# Patient Record
Sex: FEMALE | Race: BLACK OR AFRICAN AMERICAN | Employment: OTHER | URBAN - METROPOLITAN AREA
[De-identification: names, ages, dates, MRNs, and addresses within clinical notes are randomized per-mention and may not be internally consistent; named-entity substitution may affect disease eponyms.]

---
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
## 2023-02-06 NOTE — PROGRESS NOTES
Subjective:     History of Present Illness  Flako Ballard is a 11 y.o. female who presents for physical     Review of Systems  A comprehensive review of systems was negative. Patient Active Problem List   Diagnosis Code    Dysmenorrhea N94.6    Ankle sprain S93.409A    Abnormal laboratory test result R89.9    Skin rash R21    Bunion M21.619    Sports physical Z02.5     Patient Active Problem List    Diagnosis Date Noted    Sports physical 01/25/2018    Skin rash 05/22/2015    Bunion 05/22/2015    Abnormal laboratory test result 12/16/2014    Dysmenorrhea 12/02/2014    Ankle sprain 12/02/2014     Current Outpatient Prescriptions   Medication Sig Dispense Refill    metroNIDAZOLE (FLAGYL) 500 mg tablet Take 1 Tab by mouth three (3) times daily for 10 days. 30 Tab 0    drospirenone-ethinyl estradiol (MARIA) 3-0.03 mg tab Take 1 Tab by mouth daily. 1 Dose Pack 3    loratadine (CLARITIN) 10 mg tablet Take 1 Tab by mouth daily. 90 Tab 1    fluticasone (FLONASE) 50 mcg/actuation nasal spray One spray each nostril in am and one in school as needed 2 Bottle 3    hydrocortisone (HYTONE) 2.5 % topical cream Apply  to affected area two (2) times a day. use thin layer 30 g 2    acetaminophen (TYLENOL) 325 mg tablet Take  by mouth every four (4) hours as needed for Pain.  clotrimazole (CLOTRIM ANTIFUNGAL) 1 % topical cream Apply  to affected area two (2) times a day. 15 g 2     Allergies   Allergen Reactions    Pollen Extracts Swelling and Sneezing     Past Medical History:   Diagnosis Date    Heavy menstrual bleeding     Menstrual cramp      No past surgical history on file.   Family History   Problem Relation Age of Onset    Diabetes Maternal Grandmother     Hypertension Maternal Grandmother     Cancer Maternal Grandmother      breast    Lung Disease Maternal Grandfather     Asthma Maternal Grandfather      Social History   Substance Use Topics    Smoking status: Never Smoker    Smokeless tobacco: Never Used    Alcohol use Not on file            Objective:     Visit Vitals    /62 (BP 1 Location: Left arm, BP Patient Position: Sitting)    Pulse 83    Temp 98.7 °F (37.1 °C) (Oral)    Resp 18    Ht 5' 5\" (1.651 m)    Wt 192 lb (87.1 kg)    LMP 08/23/2017    SpO2 100%    BMI 31.95 kg/m2     Visit Vitals    /62 (BP 1 Location: Left arm, BP Patient Position: Sitting)    Pulse 83    Temp 98.7 °F (37.1 °C) (Oral)    Resp 18    Ht 5' 5\" (1.651 m)    Wt 192 lb (87.1 kg)    LMP 08/23/2017    SpO2 100%    BMI 31.95 kg/m2       General appearance  alert, cooperative, no distress, appears stated age   Head  Normocephalic, without obvious abnormality, atraumatic   Eyes  conjunctivae/corneas clear. PERRL, EOM's intact. Fundi benign   Ears  normal TM's and external ear canals AU   Nose Nares normal. Septum midline. Mucosa normal. No drainage or sinus tenderness. Throat Lips, mucosa, and tongue normal. Teeth and gums normal   Neck supple, symmetrical, trachea midline, no adenopathy, thyroid: not enlarged, symmetric, no tenderness/mass/nodules, no carotid bruit and no JVD   Back   symmetric, no curvature. ROM normal. No CVA tenderness   Lungs   clear to auscultation bilaterally   Breasts  no masses, tenderness   Heart  regular rate and rhythm, S1, S2 normal, no murmur, click, rub or gallop   Abdomen   soft, non-tender. Bowel sounds normal. No masses,  No organomegaly   Pelvic Deferred   Extremities extremities normal, atraumatic, no cyanosis or edema   Pulses 2+ and symmetric   Skin Skin color, texture, turgor normal. No rashes or lesions   Lymph nodes Cervical, supraclavicular, and axillary nodes normal.   Neurologic Normal       Assessment:     Healthy 11 y.o. old female with no physical activity limitations.     Plan:   1)Anticipatory Guidance: Gave a handout on well teen issues at this age , importance of varied diet, minimize junk food, importance of regular dental care, seat belts/ sports protective gear/ helmet safety/ swimming safety, sunscreen, safe storage of any firearms in the home, healthy sexual awareness/ relationships, reviewed tobacco, alcohol and drug dangers, answered  Amy's questions about: None  2) No orders of the defined types were placed in this encounter. verbal cues/nonverbal cues (demo/gestures)/1 person assist

## 2023-03-20 ENCOUNTER — TELEPHONE (OUTPATIENT)
Dept: FAMILY MEDICINE CLINIC | Facility: CLINIC | Age: 18
End: 2023-03-20

## 2023-03-20 NOTE — TELEPHONE ENCOUNTER
Pt is trying to be seen for a possible exposure to an STD. She believes she might have and and said that she is pretty scard by it. She wants to be seen on either Thursday or Friday because she is off work but neither Dr Niya Sterling or Dr Collin Henriquez had an openings. Please advise.

## 2023-03-28 ENCOUNTER — TELEPHONE (OUTPATIENT)
Dept: FAMILY MEDICINE CLINIC | Facility: CLINIC | Age: 18
End: 2023-03-28

## 2023-03-29 NOTE — TELEPHONE ENCOUNTER
Appt scheduled.      Future Appointments   Date Time Provider Lizz Wendy   4/13/2023 10:30 AM Dominique Yao MD [facility]

## 2023-10-20 ENCOUNTER — OFFICE VISIT (OUTPATIENT)
Facility: CLINIC | Age: 18
End: 2023-10-20
Payer: OTHER GOVERNMENT

## 2023-10-20 VITALS
OXYGEN SATURATION: 99 % | RESPIRATION RATE: 20 BRPM | BODY MASS INDEX: 32.65 KG/M2 | HEIGHT: 65 IN | WEIGHT: 196 LBS | DIASTOLIC BLOOD PRESSURE: 70 MMHG | SYSTOLIC BLOOD PRESSURE: 100 MMHG | TEMPERATURE: 97.7 F | HEART RATE: 111 BPM

## 2023-10-20 DIAGNOSIS — R45.1 AGITATION: ICD-10-CM

## 2023-10-20 DIAGNOSIS — R46.89 BEHAVIORAL CHANGE: ICD-10-CM

## 2023-10-20 DIAGNOSIS — Z34.03 FIRST PREGNANCY, THIRD TRIMESTER: ICD-10-CM

## 2023-10-20 DIAGNOSIS — Z76.89 ENCOUNTER TO ESTABLISH CARE: Primary | ICD-10-CM

## 2023-10-20 PROCEDURE — 99204 OFFICE O/P NEW MOD 45 MIN: CPT | Performed by: STUDENT IN AN ORGANIZED HEALTH CARE EDUCATION/TRAINING PROGRAM

## 2023-10-20 RX ORDER — FERROUS SULFATE 325(65) MG
1 TABLET ORAL DAILY
COMMUNITY
Start: 2023-07-24

## 2023-10-20 SDOH — ECONOMIC STABILITY: HOUSING INSECURITY
IN THE LAST 12 MONTHS, WAS THERE A TIME WHEN YOU DID NOT HAVE A STEADY PLACE TO SLEEP OR SLEPT IN A SHELTER (INCLUDING NOW)?: NO

## 2023-10-20 SDOH — ECONOMIC STABILITY: FOOD INSECURITY: WITHIN THE PAST 12 MONTHS, YOU WORRIED THAT YOUR FOOD WOULD RUN OUT BEFORE YOU GOT MONEY TO BUY MORE.: NEVER TRUE

## 2023-10-20 SDOH — ECONOMIC STABILITY: INCOME INSECURITY: HOW HARD IS IT FOR YOU TO PAY FOR THE VERY BASICS LIKE FOOD, HOUSING, MEDICAL CARE, AND HEATING?: NOT HARD AT ALL

## 2023-10-20 SDOH — ECONOMIC STABILITY: FOOD INSECURITY: WITHIN THE PAST 12 MONTHS, THE FOOD YOU BOUGHT JUST DIDN'T LAST AND YOU DIDN'T HAVE MONEY TO GET MORE.: NEVER TRUE

## 2023-10-20 ASSESSMENT — ANXIETY QUESTIONNAIRES
GAD7 TOTAL SCORE: 0
6. BECOMING EASILY ANNOYED OR IRRITABLE: 0
1. FEELING NERVOUS, ANXIOUS, OR ON EDGE: 0
7. FEELING AFRAID AS IF SOMETHING AWFUL MIGHT HAPPEN: 0
4. TROUBLE RELAXING: 0
IF YOU CHECKED OFF ANY PROBLEMS ON THIS QUESTIONNAIRE, HOW DIFFICULT HAVE THESE PROBLEMS MADE IT FOR YOU TO DO YOUR WORK, TAKE CARE OF THINGS AT HOME, OR GET ALONG WITH OTHER PEOPLE: NOT DIFFICULT AT ALL
3. WORRYING TOO MUCH ABOUT DIFFERENT THINGS: 0
2. NOT BEING ABLE TO STOP OR CONTROL WORRYING: 0
5. BEING SO RESTLESS THAT IT IS HARD TO SIT STILL: 0

## 2023-10-20 ASSESSMENT — PATIENT HEALTH QUESTIONNAIRE - PHQ9
2. FEELING DOWN, DEPRESSED OR HOPELESS: 0
SUM OF ALL RESPONSES TO PHQ9 QUESTIONS 1 & 2: 0
SUM OF ALL RESPONSES TO PHQ QUESTIONS 1-9: 0
1. LITTLE INTEREST OR PLEASURE IN DOING THINGS: 0
SUM OF ALL RESPONSES TO PHQ QUESTIONS 1-9: 0

## 2023-10-20 NOTE — PROGRESS NOTES
History of Present Illness  Dejan Morris is a 19 y.o. female who presents today for management of    Chief Complaint   Patient presents with    New Patient     CC: new patient here for healthcare maintenance    -Patient is here to establish care. -Previous PCP: Dr Cammy Cote, 3 months ago  -Works at [employer]  -She lives at home with grandparents   -Pt reports good support system with family/friends and feels safe at home.  -6 months pregnant, sees Ob/gyn at [facility]      Healthcare maintenance:  Immunizations:  Flu vacc: overdue  TDaP vacc: overdue  COVID vacc: overdue  Last Pap: never done   Sexual health: Pt declines STI screening  Mood: Reports stable mood overall. Some depression/anxiety that comes and goes. Also has problems with anger and increased irritability/agitation      10/20/2023     9:45 AM   PHQ-9    Little interest or pleasure in doing things 0   Feeling down, depressed, or hopeless 0   PHQ-2 Score 0   PHQ-9 Total Score 0       Past Medical History  Past Medical History:   Diagnosis Date    Heavy menstrual bleeding     Menstrual cramp       Surgical History  Past Surgical History:   Procedure Laterality Date    ORTHOPEDIC SURGERY      hand      Current Medications  Current Outpatient Medications   Medication Sig    FEROSUL 325 (65 Fe) MG tablet Take 1 tablet by mouth daily as directed    sertraline (ZOLOFT) 50 MG tablet Take 1 tablet by mouth daily    Prenatal Vit-DSS-Fe Cbn-FA (PRENATAL AD PO) Take by mouth     No current facility-administered medications for this visit.      Allergies/Drug Reactions  Allergies   Allergen Reactions    Pollen Extract Swelling     Other reaction(s): Sneezing      Family History  Family History   Problem Relation Age of Onset    Diabetes Maternal Grandmother     Asthma Maternal Grandfather     Cancer Maternal Grandmother         breast    Lung Disease Maternal Grandfather     Hypertension Maternal Grandmother       Social History  Social History

## 2023-10-23 ASSESSMENT — ENCOUNTER SYMPTOMS
SHORTNESS OF BREATH: 0
ABDOMINAL PAIN: 0

## 2024-01-24 PROBLEM — Z91.199 NO-SHOW FOR APPOINTMENT: Status: ACTIVE | Noted: 2024-01-24

## 2024-01-24 PROBLEM — Z67.41 TYPE O BLOOD, RH NEGATIVE: Status: ACTIVE | Noted: 2024-01-24

## 2024-01-24 PROBLEM — Z28.21 INFLUENZA VACCINATION DECLINED BY PATIENT: Status: ACTIVE | Noted: 2024-01-24

## 2024-01-24 PROBLEM — Z34.90 ENCOUNTER FOR ELECTIVE INDUCTION OF LABOR: Status: ACTIVE | Noted: 2024-01-24

## 2024-01-24 PROBLEM — J30.89 NON-SEASONAL ALLERGIC RHINITIS: Status: RESOLVED | Noted: 2019-02-20 | Resolved: 2024-01-24

## 2024-01-24 PROBLEM — O99.019 ANEMIA DURING PREGNANCY: Status: ACTIVE | Noted: 2024-01-24

## 2024-01-24 PROBLEM — Z3A.39 39 WEEKS GESTATION OF PREGNANCY: Status: ACTIVE | Noted: 2024-01-24

## 2024-01-24 PROBLEM — Z28.21 TETANUS, DIPHTHERIA, AND ACELLULAR PERTUSSIS (TDAP) VACCINATION DECLINED: Status: ACTIVE | Noted: 2024-01-24

## 2024-01-24 PROBLEM — Z02.5 SPORTS PHYSICAL: Status: RESOLVED | Noted: 2018-01-25 | Resolved: 2024-01-24

## 2024-01-25 PROBLEM — Z34.90 TERM PREGNANCY: Status: ACTIVE | Noted: 2024-01-25

## 2024-01-27 PROBLEM — O99.019 ANEMIA DURING PREGNANCY: Status: RESOLVED | Noted: 2024-01-24 | Resolved: 2024-01-27

## 2024-01-27 PROBLEM — Z34.90 ENCOUNTER FOR ELECTIVE INDUCTION OF LABOR: Status: RESOLVED | Noted: 2024-01-24 | Resolved: 2024-01-27

## 2024-01-27 PROBLEM — Z3A.39 39 WEEKS GESTATION OF PREGNANCY: Status: RESOLVED | Noted: 2024-01-24 | Resolved: 2024-01-27

## 2024-01-27 PROBLEM — Z34.90 TERM PREGNANCY: Status: RESOLVED | Noted: 2024-01-25 | Resolved: 2024-01-27

## 2024-01-27 PROBLEM — Z91.199 NO-SHOW FOR APPOINTMENT: Status: RESOLVED | Noted: 2024-01-24 | Resolved: 2024-01-27

## 2024-03-14 ENCOUNTER — OFFICE VISIT (OUTPATIENT)
Facility: CLINIC | Age: 19
End: 2024-03-14
Payer: MEDICAID

## 2024-03-14 VITALS
TEMPERATURE: 97.2 F | RESPIRATION RATE: 15 BRPM | OXYGEN SATURATION: 99 % | WEIGHT: 207 LBS | SYSTOLIC BLOOD PRESSURE: 122 MMHG | DIASTOLIC BLOOD PRESSURE: 78 MMHG | BODY MASS INDEX: 34.49 KG/M2 | HEART RATE: 78 BPM | HEIGHT: 65 IN

## 2024-03-14 DIAGNOSIS — Z30.011 ENCOUNTER FOR INITIAL PRESCRIPTION OF CONTRACEPTIVE PILLS: Primary | ICD-10-CM

## 2024-03-14 PROCEDURE — G8484 FLU IMMUNIZE NO ADMIN: HCPCS | Performed by: STUDENT IN AN ORGANIZED HEALTH CARE EDUCATION/TRAINING PROGRAM

## 2024-03-14 PROCEDURE — G8427 DOCREV CUR MEDS BY ELIG CLIN: HCPCS | Performed by: STUDENT IN AN ORGANIZED HEALTH CARE EDUCATION/TRAINING PROGRAM

## 2024-03-14 PROCEDURE — G8417 CALC BMI ABV UP PARAM F/U: HCPCS | Performed by: STUDENT IN AN ORGANIZED HEALTH CARE EDUCATION/TRAINING PROGRAM

## 2024-03-14 PROCEDURE — 99214 OFFICE O/P EST MOD 30 MIN: CPT | Performed by: STUDENT IN AN ORGANIZED HEALTH CARE EDUCATION/TRAINING PROGRAM

## 2024-03-14 PROCEDURE — 1036F TOBACCO NON-USER: CPT | Performed by: STUDENT IN AN ORGANIZED HEALTH CARE EDUCATION/TRAINING PROGRAM

## 2024-03-14 RX ORDER — NORGESTIMATE AND ETHINYL ESTRADIOL 0.25-0.035
1 KIT ORAL DAILY
Qty: 3 PACKET | Refills: 3 | Status: SHIPPED | OUTPATIENT
Start: 2024-03-14

## 2024-03-14 ASSESSMENT — PATIENT HEALTH QUESTIONNAIRE - PHQ9
SUM OF ALL RESPONSES TO PHQ QUESTIONS 1-9: 0
SUM OF ALL RESPONSES TO PHQ QUESTIONS 1-9: 0
1. LITTLE INTEREST OR PLEASURE IN DOING THINGS: 0
SUM OF ALL RESPONSES TO PHQ QUESTIONS 1-9: 0
SUM OF ALL RESPONSES TO PHQ QUESTIONS 1-9: 0
2. FEELING DOWN, DEPRESSED OR HOPELESS: 0
SUM OF ALL RESPONSES TO PHQ9 QUESTIONS 1 & 2: 0

## 2024-03-14 ASSESSMENT — ENCOUNTER SYMPTOMS: SHORTNESS OF BREATH: 0

## 2024-03-14 NOTE — PROGRESS NOTES
Chirag Mariscal is a 19 y.o. year old female who presents today for   Chief Complaint   Patient presents with    Follow-up       Is someone accompanying this pt? n    Is the patient using any DME equipment during OV? no    Depression Screening:       3/14/2024     9:07 AM 10/20/2023     9:45 AM 6/15/2023    11:01 AM   PHQ-9 Questionaire   Little interest or pleasure in doing things 0 0 0   Feeling down, depressed, or hopeless 0 0 0   PHQ-9 Total Score 0 0 0       Abuse Screening:      3/14/2024     9:00 AM   AMB Abuse Screening   Do you ever feel afraid of your partner? N   Are you in a relationship with someone who physically or mentally threatens you? N   Is it safe for you to go home? Y       Learning Assessment:  No question data found.    Fall Risk:       No data to display                    Coordination of Care:   1. \"Have you been to the ER, urgent care clinic since your last visit?  Hospitalized since your last visit?\" yes    2. \"Have you seen or consulted any other health care providers outside of the [facility] since your last visit?\" no    3. For patients aged 45-75: Has the patient had a colonoscopy / FIT/ Cologuard? no    If the patient is female:    4. For patients aged 40-74: Has the patient had a mammogram within the past 2 years? no    5. For patients aged 21-65: Has the patient had a pap smear? yes    Health Maintenance: reviewed and discussed and ordered per Provider.    Health Maintenance Due   Topic Date Due    Polio vaccine (2 of 3 - 4-dose series) 2003    Varicella vaccine (1 of 2 - 2-dose childhood series) Never done    Hepatitis B vaccine (3 of 3 - 3-dose series) 03/18/2004    HPV vaccine (1 - 2-dose series) Never done    Hepatitis C screen  Never done    Flu vaccine (1) Never done    COVID-19 Vaccine (4 - 2023-24 season) 09/01/2023    Pap smear  Never done        - Shakir Jones/MA  [facility]  Phone: [phone]  Fax: [phone]

## 2024-03-14 NOTE — PROGRESS NOTES
History of Present Illness  Chirag Mariscal is a 19 y.o. female who presents today for management of    Chief Complaint   Patient presents with    Follow-up         - pt is 7 weeks postpartum  - had a baby girl  - mood is well, stopped zoloft, does not feel like she needs it at this time  - would like to restart birth control, would like the patch    Patient Active Problem List   Diagnosis    TUSHAR (generalized anxiety disorder)    Type O blood, Rh negative    Influenza vaccination declined by patient    Tetanus, diphtheria, and acellular pertussis (Tdap) vaccination declined      Past Medical History:   Diagnosis Date    Anemia     Heavy menstrual bleeding     Menstrual cramp       Past Surgical History:   Procedure Laterality Date    ORTHOPEDIC SURGERY Right     hand      Family History   Problem Relation Age of Onset    Diabetes Maternal Grandmother     Asthma Maternal Grandfather     Cancer Maternal Grandmother         breast    Lung Disease Maternal Grandfather     Hypertension Maternal Grandmother      Social History     Socioeconomic History    Marital status: Single     Spouse name: Not on file    Number of children: Not on file    Years of education: Not on file    Highest education level: Not on file   Occupational History    Not on file   Tobacco Use    Smoking status: Former     Types: Cigarettes    Smokeless tobacco: Never   Substance and Sexual Activity    Alcohol use: Not Currently    Drug use: Not Currently    Sexual activity: Not Currently   Other Topics Concern    Not on file   Social History Narrative    Not on file     Social Determinants of Health     Financial Resource Strain: Low Risk  (10/20/2023)    Overall Financial Resource Strain (CARDIA)     Difficulty of Paying Living Expenses: Not hard at all   Food Insecurity: No Food Insecurity (1/24/2024)    Hunger Vital Sign     Worried About Running Out of Food in the Last Year: Never true     Ran Out of Food in the Last Year: Never true

## 2024-08-30 ENCOUNTER — TELEPHONE (OUTPATIENT)
Facility: CLINIC | Age: 19
End: 2024-08-30

## 2024-08-30 NOTE — TELEPHONE ENCOUNTER
Pt called for a sooner appt b/c she has a possible hair bump/cyst under (Rt) arm and one forming under her (Lt) arm.  She states her grandmother removed the head under (Rt) arm, but bump still feels hard and smells.    Scheduled first available appt:  Future Appointments   Date Time Provider Department   9/24/2024  3:15 PM Nilsa Rascon MD DMA     Please call pt to advise.    Thank you.

## 2024-09-05 ENCOUNTER — OFFICE VISIT (OUTPATIENT)
Facility: CLINIC | Age: 19
End: 2024-09-05

## 2024-09-05 VITALS
OXYGEN SATURATION: 99 % | HEART RATE: 73 BPM | DIASTOLIC BLOOD PRESSURE: 84 MMHG | TEMPERATURE: 97.9 F | WEIGHT: 181 LBS | RESPIRATION RATE: 16 BRPM | BODY MASS INDEX: 30.16 KG/M2 | SYSTOLIC BLOOD PRESSURE: 126 MMHG | HEIGHT: 65 IN

## 2024-09-05 DIAGNOSIS — L02.419 AXILLARY ABSCESS: Primary | ICD-10-CM

## 2024-09-05 DIAGNOSIS — L30.9 ECZEMA, UNSPECIFIED TYPE: ICD-10-CM

## 2024-09-05 RX ORDER — TRIAMCINOLONE ACETONIDE 0.25 MG/G
OINTMENT TOPICAL
Qty: 80 G | Refills: 3 | Status: SHIPPED | OUTPATIENT
Start: 2024-09-05 | End: 2024-09-12

## 2024-09-05 SDOH — ECONOMIC STABILITY: FOOD INSECURITY: WITHIN THE PAST 12 MONTHS, THE FOOD YOU BOUGHT JUST DIDN'T LAST AND YOU DIDN'T HAVE MONEY TO GET MORE.: NEVER TRUE

## 2024-09-05 SDOH — ECONOMIC STABILITY: FOOD INSECURITY: WITHIN THE PAST 12 MONTHS, YOU WORRIED THAT YOUR FOOD WOULD RUN OUT BEFORE YOU GOT MONEY TO BUY MORE.: NEVER TRUE

## 2024-09-05 SDOH — ECONOMIC STABILITY: INCOME INSECURITY: HOW HARD IS IT FOR YOU TO PAY FOR THE VERY BASICS LIKE FOOD, HOUSING, MEDICAL CARE, AND HEATING?: NOT HARD AT ALL

## 2024-09-05 ASSESSMENT — PATIENT HEALTH QUESTIONNAIRE - PHQ9
SUM OF ALL RESPONSES TO PHQ QUESTIONS 1-9: 1
SUM OF ALL RESPONSES TO PHQ9 QUESTIONS 1 & 2: 0
SUM OF ALL RESPONSES TO PHQ QUESTIONS 1-9: 0
SUM OF ALL RESPONSES TO PHQ QUESTIONS 1-9: 1
SUM OF ALL RESPONSES TO PHQ9 QUESTIONS 1 & 2: 1
SUM OF ALL RESPONSES TO PHQ QUESTIONS 1-9: 0
2. FEELING DOWN, DEPRESSED OR HOPELESS: SEVERAL DAYS
SUM OF ALL RESPONSES TO PHQ QUESTIONS 1-9: 1
SUM OF ALL RESPONSES TO PHQ QUESTIONS 1-9: 0
SUM OF ALL RESPONSES TO PHQ QUESTIONS 1-9: 0
2. FEELING DOWN, DEPRESSED OR HOPELESS: NOT AT ALL
SUM OF ALL RESPONSES TO PHQ QUESTIONS 1-9: 1
1. LITTLE INTEREST OR PLEASURE IN DOING THINGS: NOT AT ALL
1. LITTLE INTEREST OR PLEASURE IN DOING THINGS: NOT AT ALL

## 2024-09-05 ASSESSMENT — ENCOUNTER SYMPTOMS: SHORTNESS OF BREATH: 0

## 2024-09-05 NOTE — PROGRESS NOTES
Chirag Mariscal is a 19 y.o. year old female who presents today for   Chief Complaint   Patient presents with    Cyst     Right arm and puss also one on left       Is someone accompanying this pt? no    Is the patient using any DME equipment during OV? no    Depression Screenin/5/2024    10:34 AM 3/14/2024     9:07 AM 10/20/2023     9:45 AM 6/15/2023    11:01 AM   PHQ-9 Questionaire   Little interest or pleasure in doing things 0 0 0 0   Feeling down, depressed, or hopeless 0 0 0 0   PHQ-9 Total Score 0 0 0 0       Abuse Screening:      3/14/2024     9:00 AM   AMB Abuse Screening   Do you ever feel afraid of your partner? N   Are you in a relationship with someone who physically or mentally threatens you? N   Is it safe for you to go home? Y       Learning Assessment:  No question data found.    Fall Risk:       No data to display                    Coordination of Care:   1. \"Have you been to the ER, urgent care clinic since your last visit?  Hospitalized since your last visit?\" no    2. \"Have you seen or consulted any other health care providers outside of the [facility] since your last visit?\" no    3. For patients aged 45-75: Has the patient had a colonoscopy / FIT/ Cologuard? no    If the patient is female:    4. For patients aged 40-74: Has the patient had a mammogram within the past 2 years? no    5. For patients aged 21-65: Has the patient had a pap smear? yes    Health Maintenance: reviewed and discussed and ordered per Provider.    Health Maintenance Due   Topic Date Due    Hepatitis B vaccine (2 of 3 - 3-dose series) 2004    Varicella vaccine (1 of 2 - 13+ 2-dose series) Never done    HPV vaccine (1 - 3-dose series) Never done    Hepatitis C screen  Never done    Annual Wellness Visit (Medicare Advantage)  Never done    Pap smear  Never done    Chlamydia/GC screen  2024    Flu vaccine (1) Never done    COVID-19 Vaccine (2023- season) Never done        -

## 2024-09-05 NOTE — PROGRESS NOTES
History of Present Illness  Cihrag Mariscal is a 19 y.o. female who presents today for management of    Chief Complaint   Patient presents with    Cyst     Right arm and puss also one on left         -pt reports recent abscess under her right underarm  -this was very painful, drained and healed without intervention  -pt does report taking a couple of her grandparents' antibiotics for several days   -hx of boils and ingrown hair in her groin on and off   -she also has a flare of eczema of b/l legs      Patient Active Problem List   Diagnosis    TUSHAR (generalized anxiety disorder)    Type O blood, Rh negative    Influenza vaccination declined by patient    Tetanus, diphtheria, and acellular pertussis (Tdap) vaccination declined      Past Medical History:   Diagnosis Date    Anemia     Heavy menstrual bleeding     Menstrual cramp       Past Surgical History:   Procedure Laterality Date    ORTHOPEDIC SURGERY Right     hand      Family History   Problem Relation Age of Onset    Diabetes Maternal Grandmother     Asthma Maternal Grandfather     Cancer Maternal Grandmother         breast    Lung Disease Maternal Grandfather     Hypertension Maternal Grandmother      Social History     Socioeconomic History    Marital status: Single     Spouse name: Not on file    Number of children: Not on file    Years of education: Not on file    Highest education level: Not on file   Occupational History    Not on file   Tobacco Use    Smoking status: Former     Types: Cigarettes    Smokeless tobacco: Never   Substance and Sexual Activity    Alcohol use: Not Currently    Drug use: Not Currently    Sexual activity: Not Currently   Other Topics Concern    Not on file   Social History Narrative    Not on file     Social Determinants of Health     Financial Resource Strain: Low Risk  (9/5/2024)    Overall Financial Resource Strain (CARDIA)     Difficulty of Paying Living Expenses: Not hard at all   Food Insecurity: No Food Insecurity